# Patient Record
Sex: MALE | Race: WHITE | Employment: FULL TIME | URBAN - METROPOLITAN AREA
[De-identification: names, ages, dates, MRNs, and addresses within clinical notes are randomized per-mention and may not be internally consistent; named-entity substitution may affect disease eponyms.]

---
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
## 2022-11-20 ENCOUNTER — HEALTH MAINTENANCE LETTER (OUTPATIENT)
Age: 22
End: 2022-11-20

## 2023-08-01 ENCOUNTER — HOSPITAL ENCOUNTER (EMERGENCY)
Facility: CLINIC | Age: 23
Discharge: HOME | End: 2023-08-01
Attending: EMERGENCY MEDICINE | Admitting: EMERGENCY MEDICINE
Payer: COMMERCIAL

## 2023-08-01 VITALS
HEART RATE: 98 BPM | WEIGHT: 180 LBS | SYSTOLIC BLOOD PRESSURE: 116 MMHG | RESPIRATION RATE: 16 BRPM | TEMPERATURE: 98 F | OXYGEN SATURATION: 99 % | BODY MASS INDEX: 25.68 KG/M2 | DIASTOLIC BLOOD PRESSURE: 80 MMHG

## 2023-08-01 DIAGNOSIS — S71.111A LACERATION OF RIGHT THIGH, INITIAL ENCOUNTER: ICD-10-CM

## 2023-08-01 PROCEDURE — 99283 EMERGENCY DEPT VISIT LOW MDM: CPT | Performed by: EMERGENCY MEDICINE

## 2023-08-01 PROCEDURE — 12001 RPR S/N/AX/GEN/TRNK 2.5CM/<: CPT | Performed by: EMERGENCY MEDICINE

## 2023-08-01 PROCEDURE — 99283 EMERGENCY DEPT VISIT LOW MDM: CPT | Mod: 25 | Performed by: EMERGENCY MEDICINE

## 2023-08-01 NOTE — ED TRIAGE NOTES
RLE leg injury - lac from utility knife at work. Bandaid on wound - bleeding controlled.      Triage Assessment       Row Name 08/01/23 1116       Triage Assessment (Adult)    Airway WDL WDL       Respiratory WDL    Respiratory WDL WDL       Peripheral/Neurovascular WDL    Peripheral Neurovascular WDL WDL

## 2023-08-01 NOTE — ED PROVIDER NOTES
History     chief complaint  HPI  Patient is a 23-year-old gentleman who is otherwise healthy presenting with a right leg laceration.  He was using a  when he accidentally slipped and cut himself in the right thigh.  Good extensor and flexor function.  No distal numbness/tingling/weakness.  No other injuries.  Last tetanus was in 2014.    Review of Systems:  All organ systems below were reviewed and are negative unless indicated in the HPI.    Constitutional  Musculoskeletal  Skin  Neuro    Allergies:  Allergies   Allergen Reactions    No Known Drug Allergy        Problem List:    Patient Active Problem List    Diagnosis Date Noted    Closed right ankle fracture 10/18/2016     Priority: Medium        Past Medical History:    History reviewed. No pertinent past medical history.    Past Surgical History:    Past Surgical History:   Procedure Laterality Date    OPEN REDUCTION INTERNAL FIXATION ANKLE Right 10/19/2016    Procedure: OPEN REDUCTION INTERNAL FIXATION ANKLE;  Surgeon: Mark Stanley MD;  Location:  OR       Family History:    History reviewed. No pertinent family history.    Medications:    aluminum sulfate-calcium acetate (DOMEBORO) packet  triamcinolone (KENALOG) 0.1 % external cream          Physical Exam   BP: 116/80  Pulse: 98  Temp: 98  F (36.7  C)  Resp: 16  Weight: 81.6 kg (180 lb)  SpO2: 99 %      Gen: Vital signs reviewed  Eyes: Sclera white, pupils round  ENT: External ears and nares normal  Card: Appears well-perfused  Resp: No respiratory distress.   Extremities: Without obvious deformity.  2+ DP and PT pulses.  Skin: 2 cm laceration to the right distal medial thigh.  No muscle body visualized.  Only subcutaneous fat.  Neuro: Alert.  Good flexion and extension of the knee.      ED [facility]    -Laceration Repair    Date/Time: 8/1/2023 2:22 PM    Performed by: Renny Garcia MD  Authorized by: Renny Garcia MD    Risks,  benefits and alternatives discussed.      ANESTHESIA (see MAR for exact dosages):     Anesthesia method:  Local infiltration    Local anesthetic:  Lidocaine 1% WITH epi  LACERATION DETAILS     Location:  Leg    Leg location:  R upper leg    Length (cm):  2    REPAIR TYPE:     Repair type:  Simple    EXPLORATION:     Hemostasis achieved with:  Epinephrine    Wound exploration: entire depth of wound probed and visualized      Wound extent: no signs of injury and no vascular damage      Contaminated: no      TREATMENT:     Area cleansed with:  Saline    Amount of cleaning:  Standard    Irrigation solution:  Sterile saline    Irrigation method:  Syringe    SKIN REPAIR     Repair method:  Sutures    Suture size:  4-0    Suture material:  Nylon    Suture technique:  Horizontal mattress    Number of sutures:  3    APPROXIMATION     Approximation:  Close    POST-PROCEDURE DETAILS     Dressing:  Antibiotic ointment      PROCEDURE    Patient Tolerance:  Patient tolerated the procedure well with no immediate complications             No results found for this or any previous visit (from the past 24 hour(s)).    Medications - No data to display      Consultations:  None    Social Determinants of Health:  Denies smoking    Assessments & Plan (with Medical Decision Making)       I have reviewed the nursing notes.    I have reviewed the findings, diagnosis, plan and need for follow up with the patient.      Medical Decision Making  On arrival, patient well-appearing.  He is neurovascular intact.  Laceration appears limited to the subcutaneous region.  I do not suspect clinically significant muscle injury.  Tetanus is up-to-date for this low risk wound.  Wound repaired as above.  Discharged home in stable condition with proper return precautions.    Final diagnoses:   Laceration of right thigh, initial encounter         Renny Garcia M.D.   [facility] Emergency Department     Renny Garcia MD  08/01/23 8198

## 2023-11-25 ENCOUNTER — HEALTH MAINTENANCE LETTER (OUTPATIENT)
Age: 23
End: 2023-11-25

## 2023-12-05 ENCOUNTER — TRANSFERRED RECORDS (OUTPATIENT)
Dept: HEALTH INFORMATION MANAGEMENT | Facility: CLINIC | Age: 23
End: 2023-12-05
Payer: COMMERCIAL

## 2024-08-13 NOTE — PROGRESS NOTES
Quinton Blake  :  2000  DOS: 2024  MRN: [id number]  PCP: Clinic - [facility]    Sports Medicine Clinic Visit    HPI  Quinton Blake is a 24 year old male who is seen as a self referral presenting with left wrist and left forearm swelling.    - Mechanism of Injury:    - No inciting injury. Did get dart stuck in his volar wrist 2024 that bled and was hard to get out  - Pertinent history and prior evaluations:    - Negative xray at [facility] on 2024, per patient  - History of left distal ulna and left distal radius fractures in . Reduced and casted.    - Pain Character:   - Location:  Distal radial left forearm  - Character:  Aching pain and slight burn  - Duration:  1 week  - Course:  constant  - Endorses:    - swelling, pain, weakness in gripping, squeaking in wrist with prolonged use  - Denies:    - instability, numbness, tingling  - Alleviating factors:    -  bracing minimally  - Aggravating factors:    -  wrist flexion and extension, gripping  - Other treatments tried:    -  brace, ice, ibuprofen    - Patient Goals:    - get a formal diagnosis, discuss treatment options  - Social History:   -  Landscaping      Review of Systems  Musculoskeletal: as above  Remainder of review of systems is negative including constitutional, CV, pulmonary, GI, Skin and Neurologic except as noted in HPI or medical history.    No past medical history on file.  Past Surgical History:   Procedure Laterality Date    OPEN REDUCTION INTERNAL FIXATION ANKLE Right 10/19/2016    Procedure: OPEN REDUCTION INTERNAL FIXATION ANKLE;  Surgeon: Mark Stanley MD;  Location:  OR     No family history on file.      Objective  /80   Wt 81.6 kg (180 lb)   BMI 25.68 kg/m      General: healthy, alert and in no acute distress.    HEENT: no scleral icterus or conjunctival erythema.   Skin: no suspicious lesions or rash. No jaundice.   CV: regular rhythm by palpation, 2+ distal pulses.  Resp: normal  respiratory effort without conversational dyspnea.   Psych: normal mood and affect.    Gait: nonantalgic, appropriate coordination and balance.     Neuro:        - Sensation to light touch:    - Intact throughout the BUE including all peripheral nerve distributions.     MSK - Wrist/Hand:        - Inspection:    - Mild swelling of the radial wrist/distal forearm in the area of the de Quervain's tendons.  - No erythema, warmth, ecchymosis, lesion, or atrophy noted.        - ROM:    - Full AROM/PROM with crepitus present during all wrist movements and thumb extension, abduction.       - Palpation:    - TTP at the first dorsal compartment tendons.   - NTTP elsewhere.        - Strength:  (*antalgic)   - Wrist Extension   5   - Wrist Flexion    5   - FDI     5   - ADM     5   - FPL     5   - APB     5   - EIP     5   - EDC     5   - APL/EPB    5            - Special tests:        - CMC grind:  Neg    - Finkelstein's:  ++Pos    - TFCC compression:  Neg    - Gotti's:  Neg     Radiology  I independently reviewed the available relevant imaging in the chart with my interpretations as above in HPI.     I independently reviewed today's new relevant imaging, with the following interpretation:  - XR L wrist shows normal anatomic alignment without significant degenerative changes, no fractures or dislocations.       Assessment  1. De Quervain's tenosynovitis, left        Plan  Quinton Blake is a pleasant 24 year old male that presents with about 1 week of pain in the radial left wrist/forearm.  he has been noticing swelling and crepitus feelings with most wrist movements and thumb movements.  He has tried icing and ibuprofen which help a little.  On exam, he has a very positive Finkelstein's maneuver and pain with thumb extension and abduction.  TTP over the de Quervain's tendons and palpable crepitus with the movements described. History and physical exam appear most consistent with acute de Quervain's tenosynovitis.  Differential  also includes intersection syndrome.    We discussed the nature of the condition and available treatment options, and mutually agreed upon the following plan:    - Imaging:          - Reviewed and independently interpreted the relevant imaging in the chart, including any imaging ordered for today's clinic.  - Reviewed results and images with patient.   - Medications:          - Discussed pharmacologic options for pain relief.   - May use NSAIDs (Ibuprofen, Naproxen) or Acetaminophen (Tylenol) as needed for pain control.   Recommend considering NSAIDs for the next 2 weeks daily and evaluate for decreased swelling and pain.  - May also use topical medications such as lidocaine, IcyHot, BioFreeze, or Voltaren gel as needed for pain control.   - Injections:          - Discussed possible injection options and alternatives.    - Injection options include: Corticosteroid injection of the de Quervain's tendon sheath.    - Deferred injections today and will consider them in the future as needed.   - Modalities:          - May use ice, heat, massage or other modalities as needed.   - Bracing:          - Discussed bracing options and recommend using   Thumb spica bracing during exacerbating activities.  Currently has a well-fitting thumb spica brace.  - Activity:          - Encouraged to remain active and participate in regular activities as symptoms allow.   Avoid or modify exacerbating activities as needed.  - Follow up:          - In 2 weeks as needed for re-evaluation and update to treatment plan. May consider an injection at that time.   - May follow up sooner for new/worsening symptoms.  - May contact clinic by phone or MyChart for questions or concerns.       Quinton Landry DO, TELLO  [facility] - Sports Medicine  [facility] - Department of Orthopedic Surgery       Disclaimer:  This note was prepared and written using Dragon Medical dictation software. As a result, there may be errors in  the script that have gone undetected. Please consider this when interpreting the information in this note.

## 2024-08-14 ENCOUNTER — OFFICE VISIT (OUTPATIENT)
Dept: ORTHOPEDICS | Facility: CLINIC | Age: 24
End: 2024-08-14
Payer: COMMERCIAL

## 2024-08-14 ENCOUNTER — ANCILLARY PROCEDURE (OUTPATIENT)
Dept: GENERAL RADIOLOGY | Facility: CLINIC | Age: 24
End: 2024-08-14
Attending: STUDENT IN AN ORGANIZED HEALTH CARE EDUCATION/TRAINING PROGRAM
Payer: COMMERCIAL

## 2024-08-14 VITALS — SYSTOLIC BLOOD PRESSURE: 116 MMHG | DIASTOLIC BLOOD PRESSURE: 80 MMHG | WEIGHT: 180 LBS | BODY MASS INDEX: 25.68 KG/M2

## 2024-08-14 DIAGNOSIS — M65.4 DE QUERVAIN'S TENOSYNOVITIS, LEFT: Primary | ICD-10-CM

## 2024-08-14 DIAGNOSIS — S69.92XA INJURY OF LEFT WRIST, INITIAL ENCOUNTER: ICD-10-CM

## 2024-08-14 PROCEDURE — 73110 X-RAY EXAM OF WRIST: CPT | Mod: TC | Performed by: RADIOLOGY

## 2024-08-14 PROCEDURE — 99204 OFFICE O/P NEW MOD 45 MIN: CPT | Performed by: STUDENT IN AN ORGANIZED HEALTH CARE EDUCATION/TRAINING PROGRAM

## 2024-08-14 ASSESSMENT — PAIN SCALES - GENERAL: PAINLEVEL: MODERATE PAIN (5)

## 2024-08-14 NOTE — LETTER
2024      Quinton Blake  [address] [address] [place] [id number]      Dear Colleague,    Thank you for referring your patient, Quinton Blake, to the [facility] [place]. Please see a copy of my visit note below.    Quinton Blake  :  2000  DOS: 2024  MRN: [id number]  [facility]    Sports Medicine Clinic Visit    HPI  Quinton Blake is a 24 year old male who is seen as a self referral presenting with left wrist and left forearm swelling.    - Mechanism of Injury:    - No inciting injury. Did get dart stuck in his volar wrist 2024 that bled and was hard to get out  - Pertinent history and prior evaluations:    - Negative xray at [facility] on 2024, per patient  - History of left distal ulna and left distal radius fractures in . Reduced and casted.    - Pain Character:   - Location:  Distal radial left forearm  - Character:  Aching pain and slight burn  - Duration:  1 week  - Course:  constant  - Endorses:    - swelling, pain, weakness in gripping, squeaking in wrist with prolonged use  - Denies:    - instability, numbness, tingling  - Alleviating factors:    -  bracing minimally  - Aggravating factors:    -  wrist flexion and extension, gripping  - Other treatments tried:    -  brace, ice, ibuprofen    - Patient Goals:    - get a formal diagnosis, discuss treatment options  - Social History:   -  Landscaping      Review of Systems  Musculoskeletal: as above  Remainder of review of systems is negative including constitutional, CV, pulmonary, GI, Skin and Neurologic except as noted in HPI or medical history.    No past medical history on file.  Past Surgical History:   Procedure Laterality Date     OPEN REDUCTION INTERNAL FIXATION ANKLE Right 10/19/2016    Procedure: OPEN REDUCTION INTERNAL FIXATION ANKLE;  Surgeon: Mark Stanley MD;  Location:  OR     No family history on file.      Objective  /80   Wt 81.6 kg  (180 lb)   BMI 25.68 kg/m      General: healthy, alert and in no acute distress.    HEENT: no scleral icterus or conjunctival erythema.   Skin: no suspicious lesions or rash. No jaundice.   CV: regular rhythm by palpation, 2+ distal pulses.  Resp: normal respiratory effort without conversational dyspnea.   Psych: normal mood and affect.    Gait: nonantalgic, appropriate coordination and balance.     Neuro:        - Sensation to light touch:    - Intact throughout the BUE including all peripheral nerve distributions.     MSK - Wrist/Hand:        - Inspection:    - Mild swelling of the radial wrist/distal forearm in the area of the de Quervain's tendons.  - No erythema, warmth, ecchymosis, lesion, or atrophy noted.        - ROM:    - Full AROM/PROM with crepitus present during all wrist movements and thumb extension, abduction.       - Palpation:    - TTP at the first dorsal compartment tendons.   - NTTP elsewhere.        - Strength:  (*antalgic)   - Wrist Extension   5   - Wrist Flexion    5   - FDI     5   - ADM     5   - FPL     5   - APB     5   - EIP     5   - EDC     5   - APL/EPB    5            - Special tests:        - CMC grind:  Neg    - Finkelstein's:  ++Pos    - TFCC compression:  Neg    - Gotti's:  Neg     Radiology  I independently reviewed the available relevant imaging in the chart with my interpretations as above in HPI.     I independently reviewed today's new relevant imaging, with the following interpretation:  - XR L wrist shows normal anatomic alignment without significant degenerative changes, no fractures or dislocations.       Assessment  1. De Quervain's tenosynovitis, left        Plan  Quinton Blake is a pleasant 24 year old male that presents with about 1 week of pain in the radial left wrist/forearm.  he has been noticing swelling and crepitus feelings with most wrist movements and thumb movements.  He has tried icing and ibuprofen which help a little.  On exam, he has a very positive  Finkelstein's maneuver and pain with thumb extension and abduction.  TTP over the de Quervain's tendons and palpable crepitus with the movements described. History and physical exam appear most consistent with acute de Quervain's tenosynovitis.  Differential also includes intersection syndrome.    We discussed the nature of the condition and available treatment options, and mutually agreed upon the following plan:    - Imaging:          - Reviewed and independently interpreted the relevant imaging in the chart, including any imaging ordered for today's clinic.  - Reviewed results and images with patient.   - Medications:          - Discussed pharmacologic options for pain relief.   - May use NSAIDs (Ibuprofen, Naproxen) or Acetaminophen (Tylenol) as needed for pain control.   Recommend considering NSAIDs for the next 2 weeks daily and evaluate for decreased swelling and pain.  - May also use topical medications such as lidocaine, IcyHot, BioFreeze, or Voltaren gel as needed for pain control.   - Injections:          - Discussed possible injection options and alternatives.    - Injection options include: Corticosteroid injection of the de Quervain's tendon sheath.    - Deferred injections today and will consider them in the future as needed.   - Modalities:          - May use ice, heat, massage or other modalities as needed.   - Bracing:          - Discussed bracing options and recommend using   Thumb spica bracing during exacerbating activities.  Currently has a well-fitting thumb spica brace.  - Activity:          - Encouraged to remain active and participate in regular activities as symptoms allow.   Avoid or modify exacerbating activities as needed.  - Follow up:          - In 2 weeks as needed for re-evaluation and update to treatment plan. May consider an injection at that time.   - May follow up sooner for new/worsening symptoms.  - May contact clinic by phone or MyChart for questions or concerns.       Quinton  DO Frantz, CAQSM  [facility] - Department of Orthopedic Surgery       Disclaimer:  This note was prepared and written using Dragon Medical dictation software. As a result, there may be errors in the script that have gone undetected. Please consider this when interpreting the information in this note.       Again, thank you for allowing me to participate in the care of your patient.        Sincerely,        Quinton Landry DO

## 2025-01-04 ENCOUNTER — HEALTH MAINTENANCE LETTER (OUTPATIENT)
Age: 25
End: 2025-01-04